# Patient Record
Sex: MALE | Race: OTHER | Employment: UNEMPLOYED | URBAN - METROPOLITAN AREA
[De-identification: names, ages, dates, MRNs, and addresses within clinical notes are randomized per-mention and may not be internally consistent; named-entity substitution may affect disease eponyms.]

---

## 2021-09-01 ENCOUNTER — HOSPITAL ENCOUNTER (EMERGENCY)
Age: 56
Discharge: HOME | End: 2021-09-01
Attending: EMERGENCY MEDICINE

## 2021-09-01 VITALS
SYSTOLIC BLOOD PRESSURE: 162 MMHG | OXYGEN SATURATION: 99 % | DIASTOLIC BLOOD PRESSURE: 91 MMHG | HEART RATE: 98 BPM | RESPIRATION RATE: 17 BRPM

## 2021-09-01 DIAGNOSIS — T40.601A OPIATE OVERDOSE, ACCIDENTAL OR UNINTENTIONAL, INITIAL ENCOUNTER (HCC): Primary | ICD-10-CM

## 2021-09-01 PROCEDURE — 99284 EMERGENCY DEPT VISIT MOD MDM: CPT

## 2021-09-01 ASSESSMENT — ENCOUNTER SYMPTOMS
VOMITING: 0
TROUBLE SWALLOWING: 0
DIARRHEA: 0
SHORTNESS OF BREATH: 0
COLOR CHANGE: 0
BLOOD IN STOOL: 0
RHINORRHEA: 0
ABDOMINAL PAIN: 0
COUGH: 0
NAUSEA: 0

## 2021-09-02 NOTE — ED PROVIDER NOTES
ED PROVIDER NOTE    Chief Complaint   Patient presents with    Drug Overdose     Poss H OD. 2mg Narcan given IM. POLICE HOLD. A&Ox4. GCS 15. HPI:  9/1/21,   Time: 9:45 PM EDT       Joy Fitch is a 55 y.o. male presenting to the ED for drug overdose. Acute onset shortly prior to arrival, severe, improved since onset. 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 He snorted what he thought was heroin and smoked marijuana. He had one glass of wine last night. The next thing he remembers after snorting heroin in a bar today is waking up to paramedics and police. No IVDU. No current withdrawal symptoms, shortness of breath, chest pain, abdominal pain, nausea, vomiting. No recent illness or injury. EMS reports he was given narcan 2mg IM with subsequent improvement. Review of Systems:     Review of Systems   Constitutional: Negative for appetite change, chills and fever. HENT: Negative for congestion, rhinorrhea and trouble swallowing. Eyes: Negative for visual disturbance. Respiratory: Negative for cough and shortness of breath. Cardiovascular: Negative for chest pain and leg swelling. Gastrointestinal: Negative for abdominal pain, blood in stool, diarrhea, nausea and vomiting. Genitourinary: Negative for decreased urine volume, difficulty urinating, dysuria, frequency, hematuria and urgency. Musculoskeletal: Negative for myalgias, neck pain and neck stiffness. Skin: Negative for color change. Neurological: Negative for dizziness, syncope, weakness, light-headedness, numbness and headaches.         --------------------------------------------- PAST HISTORY ---------------------------------------------  Past Medical History:   History reviewed. No pertinent past medical history. Past Surgical History:   History reviewed. No pertinent surgical history.     Social History:   Social History     Socioeconomic History    Marital status: Unknown     Spouse name: None    Number of children: None    Years of education: None    Highest education level: None   Occupational History    None   Tobacco Use    Smoking status: Current Every Day Smoker     Types: Cigarettes    Smokeless tobacco: Never Used   Vaping Use    Vaping Use: Never used   Substance and Sexual Activity    Alcohol use: Yes     Comment: socially    Drug use: Yes     Types: Methamphetamines, Marijuana    Sexual activity: Yes     Partners: Female   Other Topics Concern    None   Social History Narrative    None     Social Determinants of Health     Financial Resource Strain:     Difficulty of Paying Living Expenses:    Food Insecurity:     Worried About Running Out of Food in the Last Year:     Ran Out of Food in the Last Year:    Transportation Needs:     Lack of Transportation (Medical):  Lack of Transportation (Non-Medical):    Physical Activity:     Days of Exercise per Week:     Minutes of Exercise per Session:    Stress:     Feeling of Stress :    Social Connections:     Frequency of Communication with Friends and Family:     Frequency of Social Gatherings with Friends and Family:     Attends [employer] Services:     Active Member of Clubs or Organizations:     Attends Club or Organization Meetings:     Marital Status:    Intimate Partner Violence:     Fear of Current or Ex-Partner:     Emotionally Abused:     Physically Abused:     Sexually Abused:        Family History:   History reviewed. No pertinent family history. The patients home medications have been reviewed. Allergies:   No Known Allergies        ---------------------------------------------------PHYSICAL EXAM--------------------------------------    BP (!) 162/91   Pulse 98   Resp 17   SpO2 99%     Physical Exam  Vitals and nursing note reviewed. Constitutional:       General: He is not in acute distress. Appearance: He is not toxic-appearing.    HENT:      Mouth/Throat:      Mouth: Mucous membranes are moist.   Eyes:      General: No scleral icterus. Extraocular Movements: Extraocular movements intact. Pupils: Pupils are equal, round, and reactive to light. Cardiovascular:      Rate and Rhythm: Normal rate and regular rhythm. Pulses: Normal pulses. Heart sounds: Normal heart sounds. No murmur heard. Pulmonary:      Effort: Pulmonary effort is normal. No respiratory distress. Breath sounds: Normal breath sounds. No wheezing or rales. Abdominal:      General: There is no distension. Palpations: Abdomen is soft. Tenderness: There is no abdominal tenderness. There is no guarding or rebound. Musculoskeletal:         General: No swelling or tenderness. Normal range of motion. Cervical back: Normal range of motion and neck supple. No rigidity. No muscular tenderness. Skin:     General: Skin is warm and dry. Neurological:      Mental Status: He is alert and oriented to person, place, and time. Comments: Moves all extremities with appropriate strength. Sensation grossly intact in all extremities. Psychiatric:      Comments: Normal affect, calm, cooperative, appropriate, no SI/HI/AVH, not responding to internal stimuli                 ------------------------- NURSING NOTES AND VITALS REVIEWED ---------------------------   The nursing notes within the ED encounter and vital signs as below have been reviewed by myself. BP (!) 162/91   Pulse 98   Resp 17   SpO2 99%   Oxygen Saturation Interpretation: Normal    The patients available past medical records and past encounters were reviewed. ------------------------------ ED COURSE/MEDICAL DECISION MAKING----------------------    Counseling: The emergency provider has spoken with the patient and discussed todays results, in addition to providing specific details for the plan of care and counseling regarding the diagnosis and prognosis.   Questions are answered at this time and they are agreeable with the plan. ED Course/Medical Decision Makin y.o. male here with accidental opiate overdose s/p reversal w/ narcan in the field. On arrival Non-toxic appearing, afebrile, hemodynamically stable, and in no acute distress. Breathing comfortably on room air without respiratory distress. Observed in ED and on reevaluation has no signs/sx of excess sedation or acute opiate withdrawal. Will refer for chemical dependency resources. After discussion of findings and return precautions, patient agrees with plan for discharge and outpatient follow up with PCP.       --------------------------------- IMPRESSION AND DISPOSITION ---------------------------------    IMPRESSION  1. Opiate overdose, accidental or unintentional, initial encounter ([facility] [address]        DISPOSITION  Disposition: Discharge to home  Patient condition is good    NOTE: This report was transcribed using voice recognition software.  Every effort was made to ensure accuracy; however, inadvertent computerized transcription errors may be present    Milly Nance MD  Attending Emergency Physician         Milly Nance MD  [phone]